# Patient Record
Sex: MALE | Race: BLACK OR AFRICAN AMERICAN | URBAN - METROPOLITAN AREA
[De-identification: names, ages, dates, MRNs, and addresses within clinical notes are randomized per-mention and may not be internally consistent; named-entity substitution may affect disease eponyms.]

---

## 2017-07-03 ENCOUNTER — EMERGENCY (EMERGENCY)
Facility: HOSPITAL | Age: 42
LOS: 1 days | Discharge: HOME | End: 2017-07-03
Attending: EMERGENCY MEDICINE
Payer: MEDICAID

## 2017-07-03 VITALS
HEART RATE: 61 BPM | RESPIRATION RATE: 20 BRPM | TEMPERATURE: 98 F | OXYGEN SATURATION: 97 % | DIASTOLIC BLOOD PRESSURE: 63 MMHG | SYSTOLIC BLOOD PRESSURE: 100 MMHG

## 2017-07-03 VITALS — WEIGHT: 179.9 LBS

## 2017-07-03 LAB — HETEROPH AB TITR SER AGGL: NEGATIVE — SIGNIFICANT CHANGE UP

## 2017-07-03 PROCEDURE — 86308 HETEROPHILE ANTIBODY SCREEN: CPT

## 2017-07-03 PROCEDURE — 87081 CULTURE SCREEN ONLY: CPT

## 2017-07-03 PROCEDURE — 96372 THER/PROPH/DIAG INJ SC/IM: CPT

## 2017-07-03 PROCEDURE — 36415 COLL VENOUS BLD VENIPUNCTURE: CPT

## 2017-07-03 PROCEDURE — 99284 EMERGENCY DEPT VISIT MOD MDM: CPT

## 2017-07-03 PROCEDURE — 99283 EMERGENCY DEPT VISIT LOW MDM: CPT | Mod: 25

## 2017-07-03 RX ORDER — DEXAMETHASONE 0.5 MG/5ML
8 ELIXIR ORAL ONCE
Qty: 0 | Refills: 0 | Status: COMPLETED | OUTPATIENT
Start: 2017-07-03 | End: 2017-07-03

## 2017-07-03 RX ADMIN — Medication 8 MILLIGRAM(S): at 17:57

## 2017-07-03 NOTE — ED STATDOCS - PROGRESS NOTE DETAILS
Pt resting comfortably, vss and in NAD at this time. Pts mono test negative. Likely viral in etiology, strep culture sent and will f/u. Pt stable for d/c.

## 2017-07-03 NOTE — ED STATDOCS - OBJECTIVE STATEMENT
42 y/o male with no significant PMHx presents to the ED with c/o swelling to uvula associated with SOB and fatigue, onset yesterday.  Positive sick contact, girlfriend had sore throat. Denies N/V/D, and any other acute symptoms and complaints at this time.

## 2017-07-03 NOTE — ED STATDOCS - ATTENDING CONTRIBUTION TO CARE
I, Cristina Martini, performed the initial face to face bedside interview with this patient regarding history of present illness, review of symptoms and relevant past medical, social and family history.  I completed an independent physical examination.  I was the initial provider who evaluated this patient. I have signed out the follow up of any pending tests (i.e. labs, radiological studies) to the ACP.  I have communicated the patient’s plan of care and disposition with the ACP.  The history, relevant review of systems, past medical and surgical history, medical decision making, and physical examination was documented by the scribe in my presence and I attest to the accuracy of the documentation.

## 2017-07-03 NOTE — ED ADULT TRIAGE NOTE - CHIEF COMPLAINT QUOTE
sore throat. swollen uvula. received benadryl and Pepcid. allergic reaction to shellfish probable in [employer].

## 2017-07-03 NOTE — ED ADULT NURSE NOTE - OBJECTIVE STATEMENT
pt received in [facility]. pt is awake alert oriented following commands and speaking coherently. pt presents to er today complaining of swelling to uvula and sore throat. tonsils and uvula with exudate and swollen to observation. lung sounds cta bl. resp even and unlabored. lung sounds cta bl.

## 2017-07-05 LAB
CULTURE RESULTS: SIGNIFICANT CHANGE UP
SPECIMEN SOURCE: SIGNIFICANT CHANGE UP

## 2017-07-05 RX ORDER — CEPHALEXIN 500 MG
1 CAPSULE ORAL
Qty: 20 | Refills: 0 | OUTPATIENT
Start: 2017-07-05 | End: 2017-07-15
